# Patient Record
Sex: FEMALE | Race: BLACK OR AFRICAN AMERICAN | URBAN - METROPOLITAN AREA
[De-identification: names, ages, dates, MRNs, and addresses within clinical notes are randomized per-mention and may not be internally consistent; named-entity substitution may affect disease eponyms.]

---

## 2017-02-08 ENCOUNTER — EXTERNAL NURSING HOME DOCUMENTATION (OUTPATIENT)
Dept: FAMILY MEDICINE CLINIC | Age: 82
End: 2017-02-08

## 2017-02-08 DIAGNOSIS — I10 ESSENTIAL HYPERTENSION: Primary | ICD-10-CM

## 2017-02-08 DIAGNOSIS — N18.30 CKD (CHRONIC KIDNEY DISEASE) STAGE 3, GFR 30-59 ML/MIN (HCC): ICD-10-CM

## 2017-02-08 DIAGNOSIS — F51.04 CHRONIC INSOMNIA: ICD-10-CM

## 2017-02-08 DIAGNOSIS — F03.A0 MILD DEMENTIA: ICD-10-CM

## 2017-02-09 NOTE — PROGRESS NOTES
Subjective:  Buster Guerrero is a 80-year-old lady seen at [facility] long-term unit for follow up. She has been here for a long time. Has a few chronic medical issues. The patient has mild dementia. She is answering simple questions. She depends on staff for many ADLs. She is denying any pain, dyspnea or other new issues. Gets around in a w/c. Reports insomnia. Worse since decreasing her trazodone dose. The staff does not report any significant problems. Allergies:  No allergies. NH Med List:  Reviewed and signed. No recent studies in the chart. Objective:  VITAL SIGNS:  Stable. Her blood pressure is better. Afebrile per nurses notes. GENERAL:  Pleasant, Frail, elderly lady in wheelchair, NAD, answering simple questions properly. HEENT:  Unremarkable. Bitemporal wasting. NECK:  Supple. No JVD or bruit. HEART:  Regular with distant sounds. LUNGS:  Diminished sounds but clear. ABDOMEN:  Soft and nontender. Normal bowel sounds. EXTREMITIES:  No significant edema,  DJD changes. NEUROLOGIC:  Generalized weakness. Impression:   1. HTN. 2. CKD. 3. Old CVA. 4. DJD. 5. Mild dementia. 6. Insomnia     Plan:   1. Increase Trazodone to 50 mg QHS  2. Continue current meds. 3.   Reassurance that all looks stable  4. DNR.       Meryle Sager, D.O.

## 2017-04-05 ENCOUNTER — EXTERNAL NURSING HOME DOCUMENTATION (OUTPATIENT)
Dept: FAMILY MEDICINE CLINIC | Age: 82
End: 2017-04-05

## 2017-04-05 DIAGNOSIS — I69.90 LATE EFFECTS OF CVA (CEREBROVASCULAR ACCIDENT): ICD-10-CM

## 2017-04-05 DIAGNOSIS — I10 ESSENTIAL HYPERTENSION: Primary | ICD-10-CM

## 2017-04-05 DIAGNOSIS — N18.30 CKD (CHRONIC KIDNEY DISEASE) STAGE 3, GFR 30-59 ML/MIN (HCC): ICD-10-CM

## 2017-04-05 DIAGNOSIS — M19.90 OSTEOARTHRITIS, UNSPECIFIED OSTEOARTHRITIS TYPE, UNSPECIFIED SITE: ICD-10-CM

## 2017-04-05 DIAGNOSIS — Z99.3 WHEELCHAIR BOUND: ICD-10-CM

## 2017-04-05 NOTE — PROGRESS NOTES
History of Present Illness:  Corey Carlos is a 80 y.o. lady seen at [address] [place] [address] for follow up. She has a number of chronic medical issues. She has been here for a long time. She has chronic arthritic pains, has had issues with elevated BP in the past but seems to be stable now. Her insomnia is stable with medications. She gets around in a wheelchair. She denies any new pain, dyspnea, GI,  complaints. She is eating well and sleeping well. The family visits. The staff do not report any significant new problems. Allergies:  None. Medications:  See NH list reviewed and signed. Studies:  No recent studies in chart. Physical Examination:  GENERAL:  Vital signs stable. Afebrile per nurse's notes. Pleasant, elderly frail lady in wheelchair, NAD and answering most questions properly. HEENT:  Unremarkable. NECK:  Supple, no JVD or bruits. HEART:  Regular, distant sounds. LUNGS:  Diminished sounds, but clear. ABDOMEN:  Soft, nontender. EXTREMITIES:  No significant edema. DJD changes, decreased ROM. NEURO:  Generalized weakness, otherwise nonfocal.      Impression:   HTN.  CKD. Old CVA. DJD. Debility. Wheelchair bound. Plan:  Continue current medications. Overall seems stable. DNR.     MedDATA/gwo

## 2017-04-30 PROBLEM — Z99.3 WHEELCHAIR BOUND: Status: ACTIVE | Noted: 2017-04-30

## 2017-05-31 ENCOUNTER — EXTERNAL NURSING HOME DOCUMENTATION (OUTPATIENT)
Dept: FAMILY MEDICINE CLINIC | Age: 82
End: 2017-05-31

## 2017-05-31 DIAGNOSIS — I69.90 LATE EFFECTS OF CVA (CEREBROVASCULAR ACCIDENT): ICD-10-CM

## 2017-05-31 DIAGNOSIS — M19.90 OSTEOARTHRITIS, UNSPECIFIED OSTEOARTHRITIS TYPE, UNSPECIFIED SITE: ICD-10-CM

## 2017-05-31 DIAGNOSIS — N18.30 CKD (CHRONIC KIDNEY DISEASE) STAGE 3, GFR 30-59 ML/MIN (HCC): ICD-10-CM

## 2017-05-31 DIAGNOSIS — I10 ESSENTIAL HYPERTENSION: Primary | ICD-10-CM

## 2017-05-31 DIAGNOSIS — Z99.3 WHEELCHAIR BOUND: ICD-10-CM

## 2017-06-07 NOTE — PROGRESS NOTES
History of Present Illness:  Efrain Barber is a 80 y.o. lady seen at [address] [place] [address] for follow up. She has a number of chronic medical issues. She has been here for a long time. She has chronic arthritic pains. Pt has had issues with elevated BP off and on but seems to be stable on current meds. Her insomnia is stable with medications. She gets around in a wheelchair. She denies any new pain, dyspnea, GI,  complaints. She is eating well and sleeping well. The family visits. Depends on staff for many ADL's. The staff do not report any significant new problems. Allergies:  None. Medications:  See NH list reviewed and signed. Studies:  No recent studies in chart. Physical Examination:  GENERAL:  Vital signs stable. Afebrile per nurse's notes. Pleasant, elderly frail lady in wheelchair, NAD and answering most questions properly. HEENT:  Unremarkable. NECK:  Supple, no JVD or bruits. HEART:  Regular, distant sounds. LUNGS:  Diminished sounds, but clear. ABDOMEN:  Soft, nontender. EXTREMITIES:  No significant edema. DJD changes, decreased ROM. NEURO:  Generalized weakness, otherwise nonfocal.      Impression:   HTN.  CKD. Old CVA. DJD. Debility. Wheelchair bound. Plan:  Continue current medications. Overall seems stable. DNR.     Dallas Maria D.O.

## 2017-07-26 ENCOUNTER — EXTERNAL NURSING HOME DOCUMENTATION (OUTPATIENT)
Dept: FAMILY MEDICINE CLINIC | Age: 82
End: 2017-07-26

## 2017-07-26 DIAGNOSIS — J44.9 CHRONIC OBSTRUCTIVE PULMONARY DISEASE, UNSPECIFIED COPD TYPE (HCC): ICD-10-CM

## 2017-07-26 DIAGNOSIS — I48.0 PAROXYSMAL A-FIB (HCC): ICD-10-CM

## 2017-07-26 DIAGNOSIS — Z86.73 HISTORY OF CVA (CEREBROVASCULAR ACCIDENT): Primary | ICD-10-CM

## 2017-07-26 DIAGNOSIS — I10 ESSENTIAL HYPERTENSION: ICD-10-CM

## 2017-07-26 DIAGNOSIS — R13.10 DYSPHAGIA, UNSPECIFIED TYPE: ICD-10-CM

## 2017-07-26 DIAGNOSIS — Z93.1 PEG (PERCUTANEOUS ENDOSCOPIC GASTROSTOMY) STATUS (HCC): ICD-10-CM

## 2017-07-26 DIAGNOSIS — I50.32 CHRONIC DIASTOLIC CONGESTIVE HEART FAILURE (HCC): ICD-10-CM

## 2017-07-26 DIAGNOSIS — N18.9 CKD (CHRONIC KIDNEY DISEASE), UNSPECIFIED STAGE: ICD-10-CM

## 2017-07-26 NOTE — PROGRESS NOTES
THIS IS NOT A COMPLETE MEDICAL RECORD ON THIS PATIENT. THIS IS A PATIENT AT [facility]. PLEASE CONTACT THE FACILITY LISTED FOR MORE INFORMATION ON THIS PATIENT. THE COMPLETE RECORD RESIDES WITH THIS LONG TERM CARE FACILITY. HISTORY OF PRESENT ILLNESS  Siena Malone is a 80 y.o. female. This patient is currently under the care of Dr. Henrry Gordillo at [facility]. The patient was recently readmitted to this facility following hospitalization 07/09/17- 07/20/17 related to acute encephalopathy due to bilateral thalamic infarct. The patient was also treated for dysphagia, with PEG placement on 07/18/17. Her past medical history also includes CKD, anemia, chronic diastolic heart failure, HTN, paroxysmal afib, bilateral carotid stenosis, COPD, and esophageal stenosis. The patient is found this morning lying in bed, resting. She is alert to voice and nods her head to answer simple questions. HPI    Review of Systems   Unable to perform ROS: Medical condition       Physical Exam   Constitutional: She appears well-developed. No distress. HENT:   Head: Normocephalic and atraumatic. Cardiovascular: Normal rate and regular rhythm. Pulmonary/Chest: Effort normal and breath sounds normal. No respiratory distress. She has no wheezes. She has no rales. Abdominal: Bowel sounds are normal. She exhibits no distension. There is no tenderness. PEG in place   Musculoskeletal: She exhibits no edema. Neurological: She is alert. Nodding head to answer simple questions, no verbalization during visit   Skin: Skin is warm and dry. Nursing note reviewed. ASSESSMENT and PLAN  Encounter Diagnoses   Name Primary?     History of CVA (cerebrovascular accident) Yes    Dysphagia, unspecified type     PEG (percutaneous endoscopic gastrostomy) status ([facility]     CKD (chronic kidney disease), unspecified stage     Chronic diastolic congestive heart failure (HCC)     Essential hypertension     Paroxysmal a-fib ([facility]     Chronic obstructive pulmonary disease, unspecified COPD type ([facility]      Continue PT/OT/ST as tolerated. Lab results and schedule of future lab studies reviewed. CBC and CMP drawn this morning, await results. Reviewed medications and side effects. Continue current medications. Nursing to continue to monitor closely and notify MD/NP of any change in condition.

## 2017-08-30 ENCOUNTER — EXTERNAL NURSING HOME DOCUMENTATION (OUTPATIENT)
Dept: FAMILY MEDICINE CLINIC | Age: 82
End: 2017-08-30

## 2017-08-30 DIAGNOSIS — I69.90 LATE EFFECTS OF CVA (CEREBROVASCULAR ACCIDENT): Primary | ICD-10-CM

## 2017-08-30 DIAGNOSIS — D64.9 CHRONIC ANEMIA: ICD-10-CM

## 2017-08-30 DIAGNOSIS — N18.30 CKD (CHRONIC KIDNEY DISEASE) STAGE 3, GFR 30-59 ML/MIN (HCC): ICD-10-CM

## 2017-08-30 DIAGNOSIS — Z93.1 S/P PERCUTANEOUS ENDOSCOPIC GASTROSTOMY (PEG) TUBE PLACEMENT (HCC): ICD-10-CM

## 2017-08-30 DIAGNOSIS — I10 ESSENTIAL HYPERTENSION: ICD-10-CM

## 2017-08-30 DIAGNOSIS — I69.320 CVA, OLD, APHASIA: ICD-10-CM

## 2017-08-30 DIAGNOSIS — R13.10 DYSPHAGIA, UNSPECIFIED TYPE: ICD-10-CM

## 2017-08-30 NOTE — PROGRESS NOTES
History of Present Illness:   Luiz Machuca is a 80 y. o. lady seen at [address] [place] [address] for follow up. She has been here for a long time. She has a number of chronic medical issues.  She had a recent CVA with aphasia and dysphagia. She has a PEG tube. The patient is nonverbal.  She follows some simple commands. The staff do not report any significant new problems today. Yarelis Saravia  Allergies: Ladean Randal       Medications:  See NH list reviewed and signed. Studies:  Most recent studies reviewed and are stable. Physical Examination:  GENERAL:  Vital signs stable.  Afebrile per nurse's notes. Elderly frail lady in bed, NAD, nonverbal.   HEENT:  Unremarkable. She seems to have some facial edema on the right. Left eye is closed. NECK:  Supple, no JVD or bruits. HEART:  Regular, distant sounds.    LUNGS:  Diminished sounds, but clear. ABDOMEN:  Soft, nontender, PEG in place and looks clean. EXTREMITIES:  No significant edema. Generalized DJD. NEURO:  Generalized weakness.     Impression:   CVA. Dysphagia. PEG tube. Aphasia. HTN. Chronic anemia. CKD.     Plan:  Continue current medications. Focus on comfort. DNR.     MedDATA/gwo

## 2017-08-31 PROBLEM — R13.10 DYSPHAGIA: Status: ACTIVE | Noted: 2017-08-31

## 2017-08-31 PROBLEM — Z93.1 S/P PERCUTANEOUS ENDOSCOPIC GASTROSTOMY (PEG) TUBE PLACEMENT (HCC): Status: ACTIVE | Noted: 2017-08-31

## 2017-08-31 PROBLEM — I69.320 CVA, OLD, APHASIA: Status: ACTIVE | Noted: 2017-08-31

## 2017-08-31 PROBLEM — D64.9 CHRONIC ANEMIA: Status: ACTIVE | Noted: 2017-08-31

## 2017-09-11 ENCOUNTER — EXTERNAL NURSING HOME DOCUMENTATION (OUTPATIENT)
Dept: FAMILY MEDICINE CLINIC | Age: 82
End: 2017-09-11

## 2017-09-11 VITALS
HEART RATE: 85 BPM | SYSTOLIC BLOOD PRESSURE: 118 MMHG | RESPIRATION RATE: 20 BRPM | OXYGEN SATURATION: 99 % | DIASTOLIC BLOOD PRESSURE: 75 MMHG | TEMPERATURE: 96.8 F

## 2017-09-11 DIAGNOSIS — I10 ESSENTIAL HYPERTENSION: ICD-10-CM

## 2017-09-11 DIAGNOSIS — I48.0 PAROXYSMAL A-FIB (HCC): ICD-10-CM

## 2017-09-11 DIAGNOSIS — N18.30 CKD (CHRONIC KIDNEY DISEASE) STAGE 3, GFR 30-59 ML/MIN (HCC): ICD-10-CM

## 2017-09-11 DIAGNOSIS — D64.9 ANEMIA, UNSPECIFIED TYPE: ICD-10-CM

## 2017-09-11 DIAGNOSIS — J44.9 CHRONIC OBSTRUCTIVE PULMONARY DISEASE, UNSPECIFIED COPD TYPE (HCC): ICD-10-CM

## 2017-09-11 DIAGNOSIS — S91.309A: ICD-10-CM

## 2017-09-11 DIAGNOSIS — Z86.73 HISTORY OF CVA (CEREBROVASCULAR ACCIDENT): Primary | ICD-10-CM

## 2017-09-11 NOTE — PROGRESS NOTES
THIS IS NOT A COMPLETE MEDICAL RECORD ON THIS PATIENT. THIS IS A PATIENT AT [facility]. PLEASE CONTACT THE FACILITY LISTED FOR MORE INFORMATION ON THIS PATIENT. THE COMPLETE RECORD RESIDES WITH THIS LONG TERM CARE FACILITY. HISTORY OF PRESENT ILLNESS  Jojo Maciel is a 80 y.o. female. This patient is currently under the care of Dr. Denver Copping at [facility]. The patient was recently readmitted to this facility following hospitalization 07/09/17- 07/20/17 related to acute encephalopathy due to bilateral thalamic infarct. The patient was also treated for dysphagia, with PEG placement on 07/18/17. Her past medical history also includes CKD, anemia, chronic diastolic heart failure, HTN, paroxysmal afib, bilateral carotid stenosis, COPD, and esophageal stenosis. The patient is found this morning lying in bed, resting. She is alert to voice and nods her head to answer simple questions. Patient is seen today per nursing request.  She was noted overnight by nursing to have signs of shortness of breath. She was also noted to be mildly tachycardic, per nursing. She was placed on O2 via nasal cannula and chest x-ray was completed. Chest x-ray indicates no acute process. Per nursing, vitals are now within normal range. Patient has history of left heel wound, that has been followed by wound care nursing. Per nursing, overnight, patient was noted to have several more small skin alterations to bilateral feet. Visit Vitals    /75    Pulse 85    Temp 96.8 °F (36 °C)    Resp 20    SpO2 99%     HPI    Review of Systems   Unable to perform ROS: Medical condition       Physical Exam   Constitutional: She appears well-developed. No distress. HENT:   Head: Normocephalic and atraumatic. Cardiovascular: Normal rate and regular rhythm. Pulmonary/Chest: Effort normal and breath sounds normal. No respiratory distress. She has no wheezes. She has no rales.    On 2L O2 via nasal cannula Abdominal: Bowel sounds are normal. She exhibits no distension. There is no tenderness. PEG in place   Musculoskeletal: She exhibits no edema. Prevalon boots to bilateral feet   Neurological: She is alert. Nodding head to answer simple questions, no verbalization during visit  Follows command to squeeze hands   Skin: Skin is warm and dry. Nursing note and vitals reviewed. ASSESSMENT and PLAN  Encounter Diagnoses   Name Primary?  History of CVA (cerebrovascular accident) Yes    CKD (chronic kidney disease) stage 3, GFR 30-59 ml/min     Essential hypertension     Paroxysmal a-fib (HCC)     Anemia, unspecified type     Wound, open, foot, unspecified laterality, initial encounter     Chronic obstructive pulmonary disease, unspecified COPD type ([facility]      Will order bilateral lower extremity arterial dopplers. STAT CBC, CMP. Will order albuterol nebulizer q6h PRN. Lab results and schedule of future lab studies reviewed   Reviewed medications and side effects in detail    Nursing to continue to monitor closely and notify MD/NP of any change in condition.

## 2017-09-12 ENCOUNTER — EXTERNAL NURSING HOME DOCUMENTATION (OUTPATIENT)
Dept: FAMILY MEDICINE CLINIC | Age: 82
End: 2017-09-12

## 2017-09-12 VITALS
TEMPERATURE: 98.7 F | RESPIRATION RATE: 20 BRPM | DIASTOLIC BLOOD PRESSURE: 62 MMHG | HEART RATE: 78 BPM | SYSTOLIC BLOOD PRESSURE: 103 MMHG | OXYGEN SATURATION: 94 %

## 2017-09-12 DIAGNOSIS — N39.0 URINARY TRACT INFECTION WITH HEMATURIA, SITE UNSPECIFIED: Primary | ICD-10-CM

## 2017-09-12 DIAGNOSIS — N18.30 CKD (CHRONIC KIDNEY DISEASE) STAGE 3, GFR 30-59 ML/MIN (HCC): ICD-10-CM

## 2017-09-12 DIAGNOSIS — R31.9 URINARY TRACT INFECTION WITH HEMATURIA, SITE UNSPECIFIED: Primary | ICD-10-CM

## 2017-09-12 DIAGNOSIS — D72.829 LEUKOCYTOSIS, UNSPECIFIED TYPE: ICD-10-CM

## 2017-09-12 DIAGNOSIS — R79.9 ELEVATED BUN: ICD-10-CM

## 2017-09-12 DIAGNOSIS — Z86.73 HISTORY OF CVA (CEREBROVASCULAR ACCIDENT): ICD-10-CM

## 2017-09-12 DIAGNOSIS — R53.83 LETHARGY: ICD-10-CM

## 2017-09-12 NOTE — PROGRESS NOTES
THIS IS NOT A COMPLETE MEDICAL RECORD ON THIS PATIENT. THIS IS A PATIENT AT [facility]. PLEASE CONTACT THE FACILITY LISTED FOR MORE INFORMATION ON THIS PATIENT. THE COMPLETE RECORD RESIDES WITH THIS LONG TERM CARE FACILITY. HISTORY OF PRESENT ILLNESS  Steve Galicia is a 80 y.o. female. This patient is currently under the care of Dr. Rita Day at [facility]. The patient was recently readmitted to this facility following hospitalization 07/09/17- 07/20/17 related to acute encephalopathy due to bilateral thalamic infarct. The patient was also treated for dysphagia, with PEG placement on 07/18/17. Her past medical history also includes CKD, anemia, chronic diastolic heart failure, HTN, paroxysmal afib, bilateral carotid stenosis, COPD, and esophageal stenosis. The patient is seen today for follow-up. At time of yesterday's visit STAT labs were ordered. CBC was significant for WBC-22.6, Rommel-19.3. BMP sample was hemolyzed, but significant for K-6.1, Na-144, BUN-80, Creatinine-1.43. Urinalysis was collected and indicated moderate leukocytes, small blood. The patient also had bilateral lower extremity arterial dopplers completed related to skin alterations of bilateral feet. Arterial doppler indicated moderate-severe bilateral PVD of lower extremities. The patient was started on Keflex 500 mg via PEG bid x 7 days. Tube flushes were increased to 200 ml of water q4h. The patient's vitals have been stable, per nursing. Visit Vitals    /62    Pulse 78    Temp 98.7 °F (37.1 °C)    Resp 20    SpO2 94%       HPI    Review of Systems   Unable to perform ROS: Medical condition       Physical Exam   Constitutional: She appears well-developed. No distress. HENT:   Head: Normocephalic and atraumatic. Cardiovascular: Normal rate and regular rhythm. Pulmonary/Chest: Effort normal and breath sounds normal. No respiratory distress. She has no wheezes. She has no rales.    On 2L O2 via nasal cannula   Abdominal: Bowel sounds are normal. She exhibits no distension. There is no tenderness. PEG in place   Musculoskeletal: She exhibits no edema. Prevalon boots to bilateral feet   Neurological:   Moving bilateral upper extremities freely  Does not open eyes at time of visit   Skin: Skin is warm and dry. Nursing note and vitals reviewed. ASSESSMENT and PLAN  Encounter Diagnoses   Name Primary?  Urinary tract infection with hematuria, site unspecified Yes    Leukocytosis, unspecified type     Lethargy     Elevated BUN     CKD (chronic kidney disease) stage 3, GFR 30-59 ml/min     History of CVA (cerebrovascular accident)      Continue Keflex 500 mg po bid x 7 days. Continue Water flushes 200 ml via PEG q4h. Lab results and schedule of future lab studies reviewed. Repeat CBC and CMP tomorrow morning, 09/13/17. Reviewed medications and side effects  Discussed above plan of care with Dr. Jason Ramey in detail. Requested that social work set up care plan meeting with patient's family to meet with Dr. Jason Ramey tomorrow at time of his visit to discuss goals of care.

## 2017-09-13 ENCOUNTER — EXTERNAL NURSING HOME DOCUMENTATION (OUTPATIENT)
Dept: FAMILY MEDICINE CLINIC | Age: 82
End: 2017-09-13

## 2017-09-13 DIAGNOSIS — R13.10 DYSPHAGIA, UNSPECIFIED TYPE: ICD-10-CM

## 2017-09-13 DIAGNOSIS — I73.9 PVD (PERIPHERAL VASCULAR DISEASE) (HCC): ICD-10-CM

## 2017-09-13 DIAGNOSIS — N18.30 CKD (CHRONIC KIDNEY DISEASE) STAGE 3, GFR 30-59 ML/MIN (HCC): ICD-10-CM

## 2017-09-13 DIAGNOSIS — I69.320 CVA, OLD, APHASIA: ICD-10-CM

## 2017-09-13 DIAGNOSIS — N39.0 URINARY TRACT INFECTION WITHOUT HEMATURIA, SITE UNSPECIFIED: Primary | ICD-10-CM

## 2017-09-13 DIAGNOSIS — R23.8 BLISTERS OF MULTIPLE SITES: ICD-10-CM

## 2017-09-13 DIAGNOSIS — I10 ESSENTIAL HYPERTENSION: ICD-10-CM

## 2017-09-13 DIAGNOSIS — Z93.1 S/P PERCUTANEOUS ENDOSCOPIC GASTROSTOMY (PEG) TUBE PLACEMENT (HCC): ICD-10-CM

## 2017-09-13 DIAGNOSIS — D64.9 CHRONIC ANEMIA: ICD-10-CM

## 2017-09-15 NOTE — PROGRESS NOTES
History of Present Illness:   Vivienne Thompson is a 80 y. o. lady seen at [address] [place] [address] for follow up. She has been here for a long time. She has a number of chronic medical issues.  She had a recent CVA with aphasia and dysphagia. She has a PEG tube. The patient is nonverbal.  She is bedbound. Depends on staff for all ADLs. She follows some simple commands. Patient developed fevers a few days ago. Also has had bilateral heel blisters and some foot ulcerations. CXR was negative. UA was positive and she is on Keflex for UTI. Arterial Dopplers of distal legs show moderate PVD. She is tolerating tube feeding well. We increased her free water via PEG because of elevated kidney numbers. The staff do not report any significant new problems today. Ladonna Fleming  Allergies: Erin Blanks       Medications:  See NH list reviewed and signed. Studies:  Recent studies reviewed      Physical Examination:  GENERAL:  Vital signs stable.  Afebrile per nurse's notes. Elderly frail lady in bed, NAD, nonverbal.  Follows simple commands. HEENT:  Unremarkable. Left eye is closed. NECK:  Supple, no JVD or bruits. HEART:  Regular, distant sounds.    LUNGS:  Diminished sounds, but clear. ABDOMEN:  Soft, nontender, PEG in place and looks clean. EXTREMITIES:  No significant edema. Generalized DJD. B heel blisters with small foot ulcers. No infection. NEURO:  Generalized weakness.     Impression:   UTI  Recent CVA. Dysphagia. PEG tube. Aphasia. PVD  B heel blisters  HTN. Chronic anemia. CKD. Debility      Plan:  Finish antibiotics  Continue other medications. Focus on comfort. I do not recommend any heroic measures or hospitalizations. I met with  to have a care plan call with her family. We called her son but there was no answer and a message was left. Patient's overall prognosis is poor  DNR.       Candy Machuca D.O.

## 2017-09-30 PROBLEM — I73.9 PVD (PERIPHERAL VASCULAR DISEASE) (HCC): Status: ACTIVE | Noted: 2017-09-30

## 2017-09-30 PROBLEM — R23.8 BLISTERS OF MULTIPLE SITES: Status: RESOLVED | Noted: 2017-09-30 | Resolved: 2017-09-30

## 2017-09-30 PROBLEM — R23.8 BLISTERS OF MULTIPLE SITES: Status: ACTIVE | Noted: 2017-09-30

## 2017-10-10 ENCOUNTER — EXTERNAL NURSING HOME DOCUMENTATION (OUTPATIENT)
Dept: FAMILY MEDICINE CLINIC | Age: 82
End: 2017-10-10

## 2017-10-10 VITALS
TEMPERATURE: 99 F | SYSTOLIC BLOOD PRESSURE: 147 MMHG | OXYGEN SATURATION: 95 % | RESPIRATION RATE: 20 BRPM | DIASTOLIC BLOOD PRESSURE: 79 MMHG | HEART RATE: 89 BPM

## 2017-10-10 DIAGNOSIS — Z86.73 HISTORY OF CVA (CEREBROVASCULAR ACCIDENT): Primary | ICD-10-CM

## 2017-10-10 DIAGNOSIS — I48.0 PAROXYSMAL A-FIB (HCC): ICD-10-CM

## 2017-10-10 DIAGNOSIS — N63.0 BREAST LUMP: ICD-10-CM

## 2017-10-10 DIAGNOSIS — I73.9 PVD (PERIPHERAL VASCULAR DISEASE) (HCC): ICD-10-CM

## 2017-10-10 DIAGNOSIS — N18.9 CHRONIC KIDNEY DISEASE, UNSPECIFIED CKD STAGE: ICD-10-CM

## 2017-10-10 DIAGNOSIS — I50.32 DIASTOLIC CHF, CHRONIC (HCC): ICD-10-CM

## 2017-10-10 DIAGNOSIS — D64.9 ANEMIA, UNSPECIFIED TYPE: ICD-10-CM

## 2017-10-10 DIAGNOSIS — I10 ESSENTIAL HYPERTENSION: ICD-10-CM

## 2017-10-10 DIAGNOSIS — R13.10 DYSPHAGIA, UNSPECIFIED TYPE: ICD-10-CM

## 2017-10-10 DIAGNOSIS — J44.9 CHRONIC OBSTRUCTIVE PULMONARY DISEASE, UNSPECIFIED COPD TYPE (HCC): ICD-10-CM

## 2017-10-10 DIAGNOSIS — Z93.1 S/P PERCUTANEOUS ENDOSCOPIC GASTROSTOMY (PEG) TUBE PLACEMENT (HCC): ICD-10-CM

## 2017-10-10 NOTE — PROGRESS NOTES
THIS IS NOT A COMPLETE MEDICAL RECORD ON THIS PATIENT. THIS IS A PATIENT AT [facility]. PLEASE CONTACT THE FACILITY LISTED FOR MORE INFORMATION ON THIS PATIENT. THE COMPLETE RECORD RESIDES WITH THIS LONG TERM CARE FACILITY. HISTORY OF PRESENT ILLNESS  Andres Pak is a 80 y.o. female. This patient is currently under the care of Dr. Orlin Nelson at [facility]. The patient was recently readmitted to this facility following hospitalization 07/09/17- 07/20/17 related to acute encephalopathy due to bilateral thalamic infarct. The patient was also treated for dysphagia, with PEG placement on 07/18/17. Her past medical history also includes CKD, anemia, chronic diastolic heart failure, HTN, paroxysmal afib, bilateral carotid stenosis, COPD, PVD,and esophageal stenosis. The patient is seen today per nursing request.  Patient was noted to have episode of shortness of breath overnight. Chest x-ray was ordered by on-call provider. Chest x-ray result is now available and indicates no acute process. Per nursing, patient has had no further incidences of shortness of breath today and vitals have remained stable. Patient was also noted by nursing yesterday to have lump to right breast.    Per chart review, at Dr. Raman Graft recent visit with patient, focus on comfort was recommended. Heroic measures and hospitalizations were not recommended. Per chart, patient's family was unavailable via telephone and voicemail was left at time of visit. Visit Vitals    /79    Pulse 89    Temp 99 °F (37.2 °C)    Resp 20    SpO2 95%     HPI    Review of Systems   Unable to perform ROS: Patient nonverbal       Physical Exam   Constitutional: She appears well-developed. No distress. HENT:   Head: Normocephalic and atraumatic. Cardiovascular: Normal rate and regular rhythm. Pulmonary/Chest: Effort normal and breath sounds normal. No respiratory distress. She has no wheezes. She has no rales.  Right breast exhibits mass. On 2L O2 via nasal cannula    No skin change noted of breast   Abdominal: Bowel sounds are normal. She exhibits no distension. There is no tenderness. PEG in place   Musculoskeletal: She exhibits no edema. Prevalon boots to bilateral feet   Neurological:   Eyes open, non-verbal at time of visit   Skin: Skin is warm and dry. Nursing note and vitals reviewed. ASSESSMENT and PLAN  Encounter Diagnoses   Name Primary?  History of CVA (cerebrovascular accident) Yes    Dysphagia, unspecified type     S/P percutaneous endoscopic gastrostomy (PEG) tube placement ([address]     PVD (peripheral vascular disease) (HCC)     Chronic kidney disease, unspecified CKD stage     Anemia, unspecified type     Essential hypertension     Diastolic CHF, chronic (HCC)     Paroxysmal a-fib (HCC)     Chronic obstructive pulmonary disease, unspecified COPD type ([address]     Breast lump      Left voicemail for patient's son, Samantha Connolly, at time of visit. Mr. Elizabeth Forbes returned call. Discussed with Mr. Elizabeth Forbes, patient's current assessment, as documented above, past medical history, and Dr. Juana Rose recommendation for consideration of comfort care. Discussed hospice care and palliative care in detail. Discussed finding of breast lump with Mr. Elizabeth Forbes and recommendation to avoid aggressive pursuit of cause of lump at this time, as patient's ability to tolerate invasive testing and treatment is low related to her past medical history. Mr. Elizabeth Forbes agrees with monitoring only of breast lump and declines imaging at this time. He would like to discuss comfort care recommendations with his siblings and follow-up via telephone. Contact information provided. Lab results and schedule of future lab studies reviewed. CBC and CMP tomorrow morning. Reviewed medications and side effects    Nursing to continue to monitor closely and notify MD/NP of any change in condition.     Patient has a durable DNR on file.

## 2017-11-08 ENCOUNTER — EXTERNAL NURSING HOME DOCUMENTATION (OUTPATIENT)
Dept: FAMILY MEDICINE CLINIC | Age: 82
End: 2017-11-08

## 2017-11-08 DIAGNOSIS — I69.320 CVA, OLD, APHASIA: ICD-10-CM

## 2017-11-08 DIAGNOSIS — I50.32 DIASTOLIC CHF, CHRONIC (HCC): ICD-10-CM

## 2017-11-08 DIAGNOSIS — N63.10 LUMP OF RIGHT BREAST: ICD-10-CM

## 2017-11-08 DIAGNOSIS — R13.12 OROPHARYNGEAL DYSPHAGIA: ICD-10-CM

## 2017-11-08 DIAGNOSIS — Z93.1 S/P PERCUTANEOUS ENDOSCOPIC GASTROSTOMY (PEG) TUBE PLACEMENT (HCC): ICD-10-CM

## 2017-11-08 DIAGNOSIS — I69.90 LATE EFFECTS OF CVA (CEREBROVASCULAR ACCIDENT): Primary | ICD-10-CM

## 2017-11-08 NOTE — PROGRESS NOTES
History of Present Illness:  Ariella Bah is a 80 y. o. lady seen at [address] [place] [address] for follow up. She has been here for a long time. She has a number of chronic medical issues.  She had a recent CVA a few months ago with dysphagia. She has a PEG tube. She is mostly aphasic, she answers simple questions properly at times. She has also had problems with blisters and poor circulation in both feet, more on left. I have discussed this with our wound care nurse. Apparently the left heel and foot are not improving much. Doppler showed arterial deficit. She also has been noted to have a right breast lump. The family has decided not to pursue any diagnosis of that based on our recommendation and the patient's age and overall health. The staff do not report any significant new problems.         Allergies:  None.      Medications:  See NH list reviewed and signed.     Studies: Recent studies reviewed. She had a recent UTI which was treated with antibiotics. Her labs are significant for albumin 2.3, hemoglobin 8.2.       Physical Examination:  GENERAL:  Vital signs stable.  Afebrile per nurse's notes. Elderly frail lady in bed, NAD, opens her eyes and tells me her name. She has no other meaningful conversation. HEENT: Bitemporal wasting, otherwise unremarkable. NECK:  Supple, no JVD or bruits. HEART:  Regular, distant sounds.    LUNGS:  Diminished sounds, but clear. ABDOMEN:  Soft, nontender. EXTREMITIES:  No significant edema. DJD changes. No pedal pulses. Left foot is dressed. Blisters on right foot have scabbed over. NEURO:  Generalized weakness.      Impression:   Recent CVA. Dysphagia. PEG tube. Aphasia. Right breast lump. Diastolic CHF.     Plan:  Continue other medications. Focus on comfort. Overall as stable as expected. Family is trying to decide about how aggressive they want to be in the future. She would be a candidate for hospice if they choose. DNR.     MedCARLOSTA/wei

## 2017-11-30 PROBLEM — N63.10 LUMP OF RIGHT BREAST: Status: ACTIVE | Noted: 2017-11-30

## 2017-11-30 PROBLEM — I50.32 DIASTOLIC CHF, CHRONIC (HCC): Status: ACTIVE | Noted: 2017-11-30
